# Patient Record
Sex: MALE | Race: WHITE | NOT HISPANIC OR LATINO
[De-identification: names, ages, dates, MRNs, and addresses within clinical notes are randomized per-mention and may not be internally consistent; named-entity substitution may affect disease eponyms.]

---

## 2020-01-09 ENCOUNTER — TRANSCRIPTION ENCOUNTER (OUTPATIENT)
Age: 25
End: 2020-01-09

## 2020-06-30 ENCOUNTER — EMERGENCY (EMERGENCY)
Facility: HOSPITAL | Age: 25
LOS: 1 days | Discharge: HOME | End: 2020-06-30
Attending: STUDENT IN AN ORGANIZED HEALTH CARE EDUCATION/TRAINING PROGRAM
Payer: COMMERCIAL

## 2020-06-30 VITALS
SYSTOLIC BLOOD PRESSURE: 135 MMHG | DIASTOLIC BLOOD PRESSURE: 82 MMHG | TEMPERATURE: 98 F | HEART RATE: 78 BPM | RESPIRATION RATE: 18 BRPM | HEIGHT: 70 IN | WEIGHT: 179.9 LBS

## 2020-06-30 VITALS
HEART RATE: 77 BPM | RESPIRATION RATE: 18 BRPM | OXYGEN SATURATION: 99 % | SYSTOLIC BLOOD PRESSURE: 132 MMHG | DIASTOLIC BLOOD PRESSURE: 80 MMHG

## 2020-06-30 PROCEDURE — 12011 RPR F/E/E/N/L/M 2.5 CM/<: CPT

## 2020-06-30 PROCEDURE — 99282 EMERGENCY DEPT VISIT SF MDM: CPT | Mod: 25

## 2020-06-30 PROCEDURE — 99283 EMERGENCY DEPT VISIT LOW MDM: CPT | Mod: 25

## 2020-06-30 NOTE — ED ADULT NURSE NOTE - OBJECTIVE STATEMENT
pt developed a laceration at work when something fell off a shelf and injured his left upper lip.  area is not bleeding now and does not appear to be wide  [facility]

## 2020-06-30 NOTE — ED PROCEDURE NOTE - ATTENDING CONTRIBUTION TO CARE
Attending MD Shook: I was present during the key portions of the procedure and immediately available all other times.

## 2020-06-30 NOTE — ED PROVIDER NOTE - ATTENDING CONTRIBUTION TO CARE
Attending MD Shook:   I personally have seen and examined this patient.  ACP, Resident, medical student note reviewed and agree on plan of care and except where noted.     24y M presents with left lip laceration after work-related injury. tetanus up to date.     exam notable for 1cm over left upper lip with associated intraoral abrasion, dyspnea on exertion not violate vermillion border.    Will repair external lac, intraoral lac does not require repair, plan to dc with wound care instruction.

## 2020-06-30 NOTE — ED PROVIDER NOTE - NSFOLLOWUPINSTRUCTIONS_ED_ALL_ED_FT

## 2020-06-30 NOTE — ED PROVIDER NOTE - CLINICAL SUMMARY MEDICAL DECISION MAKING FREE TEXT BOX
23 y/o M presenting with laceration over upper lip, not involving vermillion border, does not go through intraorally. Will repair, DC home with supportive care - Kwadwo Garcia, DO PGY-2 23 y/o M presenting with laceration over upper lip, not involving vermillion border, does not go through intraorally. Intraoral laceration explored, will not need repair. External laceration repair and DC home with supportive care - Kwadwo Garcia DO PGY-2

## 2020-06-30 NOTE — ED PROVIDER NOTE - SKIN WOUND TYPE
left lip: 1cm laceration, closed intraorally with skin glue. left lip: 1cm laceration externally, 2 cm superficial laceration intraorally

## 2020-06-30 NOTE — ED PROVIDER NOTE - PATIENT PORTAL LINK FT
You can access the FollowMyHealth Patient Portal offered by [facility] by registering at the following website: [email or web address]. By joining [employer]’s FollowMyHealth portal, you will also be able to view your health information using other applications (apps) compatible with our system.

## 2021-10-14 NOTE — ED PROVIDER NOTE - OBJECTIVE STATEMENT
24y M with no significant PMHx and PSHx of Ocular implant after cataract was removed when pt was 8 y/o presents to the ED c/o left lip laceration sustained after traumatic injury today after a cast iron object weighing about 7-8 lbs hit the left side of his face.  Received Tdap vaccine today and was seen by on site medic at work who applied adhesive on the laceration. Denies HA, N/V, visual changes, purulent drainage, active bleeding from the lac, or LOC. Only taking MVI, no other meds on a daily basis. NKDA. Denies smoking hx or ETOH use.
The patient is a 41y Male complaining of flank pain.

## 2023-08-31 NOTE — ED ADULT NURSE NOTE - CCCP TRG CHIEF CMPLNT
Addended by: SAMMIE WINTER on: 8/29/2023 07:32 PM     Modules accepted: Orders    
Addended by: SAMMIE WINTER on: 8/30/2023 11:14 AM     Modules accepted: Orders    
Addended by: SAMMIE WINTER on: 8/31/2023 07:48 AM     Modules accepted: Orders    
lip lac